# Patient Record
Sex: FEMALE | Race: BLACK OR AFRICAN AMERICAN | NOT HISPANIC OR LATINO | Employment: OTHER | URBAN - METROPOLITAN AREA
[De-identification: names, ages, dates, MRNs, and addresses within clinical notes are randomized per-mention and may not be internally consistent; named-entity substitution may affect disease eponyms.]

---

## 2018-04-13 ENCOUNTER — HISTORICAL (OUTPATIENT)
Dept: RADIOLOGY | Facility: HOSPITAL | Age: 83
End: 2018-04-13

## 2019-04-30 ENCOUNTER — HISTORICAL (OUTPATIENT)
Dept: RADIOLOGY | Facility: HOSPITAL | Age: 84
End: 2019-04-30

## 2019-06-27 ENCOUNTER — HISTORICAL (OUTPATIENT)
Dept: ADMINISTRATIVE | Facility: HOSPITAL | Age: 84
End: 2019-06-27

## 2019-06-27 LAB
ABS NEUT (OLG): 4.57 X10(3)/MCL (ref 2.1–9.2)
ALBUMIN SERPL-MCNC: 3.9 GM/DL (ref 3.4–5)
ALBUMIN/GLOB SERPL: 1.1 RATIO (ref 1.1–2)
ALP SERPL-CCNC: 94 UNIT/L (ref 38–126)
ALT SERPL-CCNC: 22 UNIT/L (ref 12–78)
AST SERPL-CCNC: 21 UNIT/L (ref 15–37)
BASOPHILS # BLD AUTO: 0.1 X10(3)/MCL (ref 0–0.2)
BASOPHILS NFR BLD AUTO: 1 %
BILIRUB SERPL-MCNC: 0.9 MG/DL (ref 0.2–1)
BILIRUBIN DIRECT+TOT PNL SERPL-MCNC: 0.2 MG/DL (ref 0–0.5)
BILIRUBIN DIRECT+TOT PNL SERPL-MCNC: 0.7 MG/DL (ref 0–0.8)
BUN SERPL-MCNC: 17 MG/DL (ref 7–18)
CALCIUM SERPL-MCNC: 9.4 MG/DL (ref 8.5–10.1)
CHLORIDE SERPL-SCNC: 106 MMOL/L (ref 98–107)
CHOLEST SERPL-MCNC: 186 MG/DL (ref 0–200)
CHOLEST/HDLC SERPL: 2.7 {RATIO} (ref 0–4)
CO2 SERPL-SCNC: 30 MMOL/L (ref 21–32)
CREAT SERPL-MCNC: 0.73 MG/DL (ref 0.55–1.02)
EOSINOPHIL # BLD AUTO: 0.4 X10(3)/MCL (ref 0–0.9)
EOSINOPHIL NFR BLD AUTO: 4 %
ERYTHROCYTE [DISTWIDTH] IN BLOOD BY AUTOMATED COUNT: 15 % (ref 11.5–17)
GLOBULIN SER-MCNC: 3.4 GM/DL (ref 2.4–3.5)
GLUCOSE SERPL-MCNC: 101 MG/DL (ref 74–106)
HCT VFR BLD AUTO: 42 % (ref 37–47)
HDLC SERPL-MCNC: 68 MG/DL (ref 35–60)
HGB BLD-MCNC: 13 GM/DL (ref 12–16)
LDLC SERPL CALC-MCNC: 96 MG/DL (ref 0–129)
LYMPHOCYTES # BLD AUTO: 2.8 X10(3)/MCL (ref 0.6–4.6)
LYMPHOCYTES NFR BLD AUTO: 33 %
MCH RBC QN AUTO: 27.1 PG (ref 27–31)
MCHC RBC AUTO-ENTMCNC: 31 GM/DL (ref 33–36)
MCV RBC AUTO: 87.5 FL (ref 80–94)
MONOCYTES # BLD AUTO: 0.6 X10(3)/MCL (ref 0.1–1.3)
MONOCYTES NFR BLD AUTO: 8 %
NEUTROPHILS # BLD AUTO: 4.57 X10(3)/MCL (ref 2.1–9.2)
NEUTROPHILS NFR BLD AUTO: 54 %
PLATELET # BLD AUTO: 269 X10(3)/MCL (ref 130–400)
PMV BLD AUTO: 12.1 FL (ref 9.4–12.4)
POTASSIUM SERPL-SCNC: 3.8 MMOL/L (ref 3.5–5.1)
PROT SERPL-MCNC: 7.3 GM/DL (ref 6.4–8.2)
RBC # BLD AUTO: 4.8 X10(6)/MCL (ref 4.2–5.4)
SODIUM SERPL-SCNC: 141 MMOL/L (ref 136–145)
TRIGL SERPL-MCNC: 112 MG/DL (ref 30–150)
VLDLC SERPL CALC-MCNC: 22 MG/DL
WBC # SPEC AUTO: 8.4 X10(3)/MCL (ref 4.5–11.5)

## 2019-07-18 ENCOUNTER — HISTORICAL (OUTPATIENT)
Dept: RADIOLOGY | Facility: HOSPITAL | Age: 84
End: 2019-07-18

## 2021-10-06 ENCOUNTER — HISTORICAL (OUTPATIENT)
Dept: ADMINISTRATIVE | Facility: HOSPITAL | Age: 86
End: 2021-10-06

## 2021-10-06 LAB
ALBUMIN SERPL-MCNC: 3.4 GM/DL (ref 3.4–4.8)
ALBUMIN/GLOB SERPL: 1 RATIO (ref 1.1–2)
ALP SERPL-CCNC: 83 UNIT/L (ref 40–150)
ALT SERPL-CCNC: 15 UNIT/L (ref 0–55)
AST SERPL-CCNC: 24 UNIT/L (ref 5–34)
BILIRUB SERPL-MCNC: 0.9 MG/DL
BILIRUBIN DIRECT+TOT PNL SERPL-MCNC: 0.4 MG/DL (ref 0–0.5)
BILIRUBIN DIRECT+TOT PNL SERPL-MCNC: 0.5 MG/DL (ref 0–0.8)
BNP BLD-MCNC: 624.1 PG/ML
BUN SERPL-MCNC: 22.1 MG/DL (ref 9.8–20.1)
CALCIUM SERPL-MCNC: 9.8 MG/DL (ref 8.4–10.2)
CHLORIDE SERPL-SCNC: 105 MMOL/L (ref 98–107)
CHOLEST SERPL-MCNC: 172 MG/DL
CHOLEST/HDLC SERPL: 3 {RATIO} (ref 0–5)
CO2 SERPL-SCNC: 26 MMOL/L (ref 23–31)
CREAT SERPL-MCNC: 1.04 MG/DL (ref 0.55–1.02)
DEPRECATED CALCIDIOL+CALCIFEROL SERPL-MC: 40.7 NG/ML (ref 30–80)
ERYTHROCYTE [DISTWIDTH] IN BLOOD BY AUTOMATED COUNT: 14.6 % (ref 11.5–17)
GLOBULIN SER-MCNC: 3.5 GM/DL (ref 2.4–3.5)
GLUCOSE SERPL-MCNC: 94 MG/DL (ref 82–115)
HCT VFR BLD AUTO: 35.7 % (ref 37–47)
HDLC SERPL-MCNC: 60 MG/DL (ref 35–60)
HGB BLD-MCNC: 11.1 GM/DL (ref 12–16)
LDLC SERPL CALC-MCNC: 101 MG/DL (ref 50–140)
MCH RBC QN AUTO: 27.3 PG (ref 27–31)
MCHC RBC AUTO-ENTMCNC: 31.1 GM/DL (ref 33–36)
MCV RBC AUTO: 87.9 FL (ref 80–94)
PLATELET # BLD AUTO: 278 X10(3)/MCL (ref 130–400)
PMV BLD AUTO: 12.1 FL (ref 9.4–12.4)
POTASSIUM SERPL-SCNC: 4.1 MMOL/L (ref 3.5–5.1)
PROT SERPL-MCNC: 6.9 GM/DL (ref 5.8–7.6)
RBC # BLD AUTO: 4.06 X10(6)/MCL (ref 4.2–5.4)
SODIUM SERPL-SCNC: 141 MMOL/L (ref 136–145)
TRIGL SERPL-MCNC: 55 MG/DL (ref 37–140)
TSH SERPL-ACNC: 2.18 UIU/ML (ref 0.35–4.94)
VLDLC SERPL CALC-MCNC: 11 MG/DL
WBC # SPEC AUTO: 9.3 X10(3)/MCL (ref 4.5–11.5)

## 2022-02-03 ENCOUNTER — HISTORICAL (OUTPATIENT)
Dept: ADMINISTRATIVE | Facility: HOSPITAL | Age: 87
End: 2022-02-03

## 2022-02-04 ENCOUNTER — HISTORICAL (OUTPATIENT)
Dept: ADMINISTRATIVE | Facility: HOSPITAL | Age: 87
End: 2022-02-04

## 2022-02-04 LAB
ABS NEUT (OLG): 4.5 (ref 2.1–9.2)
ALBUMIN SERPL-MCNC: 3.9 G/DL (ref 3.4–4.8)
ALBUMIN/GLOB SERPL: 1.1 {RATIO} (ref 1.1–2)
ALP SERPL-CCNC: 76 U/L (ref 40–150)
ALT SERPL-CCNC: 9 U/L (ref 0–55)
AST SERPL-CCNC: 20 U/L (ref 5–34)
BASOPHILS # BLD AUTO: 0.1 10*3/UL (ref 0–0.2)
BASOPHILS NFR BLD AUTO: 1 %
BILIRUB SERPL-MCNC: 0.8 MG/DL
BILIRUBIN DIRECT+TOT PNL SERPL-MCNC: 0.3 (ref 0–0.5)
BILIRUBIN DIRECT+TOT PNL SERPL-MCNC: 0.5 (ref 0–0.8)
BUN SERPL-MCNC: 20.5 MG/DL (ref 9.8–20.1)
CALCIUM SERPL-MCNC: 10.1 MG/DL (ref 8.7–10.5)
CHLORIDE SERPL-SCNC: 105 MMOL/L (ref 98–107)
CHOLEST SERPL-MCNC: 182 MG/DL
CHOLEST/HDLC SERPL: 3 {RATIO} (ref 0–5)
CO2 SERPL-SCNC: 26 MMOL/L (ref 23–31)
CREAT SERPL-MCNC: 1.01 MG/DL (ref 0.55–1.02)
EOSINOPHIL # BLD AUTO: 0.3 10*3/UL (ref 0–0.9)
EOSINOPHIL NFR BLD AUTO: 4 %
ERYTHROCYTE [DISTWIDTH] IN BLOOD BY AUTOMATED COUNT: 14.4 % (ref 11.5–17)
GLOBULIN SER-MCNC: 3.6 G/DL (ref 2.4–3.5)
GLUCOSE SERPL-MCNC: 80 MG/DL (ref 82–115)
HCT VFR BLD AUTO: 41 % (ref 37–47)
HDLC SERPL-MCNC: 67 MG/DL (ref 35–60)
HEMOLYSIS INTERF INDEX SERPL-ACNC: 31
HGB BLD-MCNC: 12.8 G/DL (ref 12–16)
ICTERIC INTERF INDEX SERPL-ACNC: 1
LDLC SERPL CALC-MCNC: 104 MG/DL (ref 50–140)
LIPEMIC INTERF INDEX SERPL-ACNC: 2
LYMPHOCYTES # BLD AUTO: 1.8 10*3/UL (ref 0.6–4.6)
LYMPHOCYTES NFR BLD AUTO: 25 %
MANUAL DIFF? (OHS): NO
MCH RBC QN AUTO: 27.5 PG (ref 27–31)
MCHC RBC AUTO-ENTMCNC: 31.2 G/DL (ref 33–36)
MCV RBC AUTO: 88 FL (ref 80–94)
MONOCYTES # BLD AUTO: 0.7 10*3/UL (ref 0.1–1.3)
MONOCYTES NFR BLD AUTO: 9 %
NEUTROPHILS # BLD AUTO: 4.5 10*3/UL (ref 2.1–9.2)
NEUTROPHILS NFR BLD AUTO: 61 %
PLATELET # BLD AUTO: 171 10*3/UL (ref 130–400)
PMV BLD AUTO: 12.2 FL (ref 9.4–12.4)
POTASSIUM SERPL-SCNC: 3.3 MMOL/L (ref 3.5–5.1)
PROT SERPL-MCNC: 7.5 G/DL (ref 5.8–7.6)
RBC # BLD AUTO: 4.66 10*6/UL (ref 4.2–5.4)
SODIUM SERPL-SCNC: 144 MMOL/L (ref 136–145)
TRIGL SERPL-MCNC: 56 MG/DL (ref 37–140)
VLDLC SERPL CALC-MCNC: 11 MG/DL
WBC # SPEC AUTO: 7.4 10*3/UL (ref 4.5–11.5)

## 2022-08-30 ENCOUNTER — LAB VISIT (OUTPATIENT)
Dept: LAB | Facility: HOSPITAL | Age: 87
End: 2022-08-30
Attending: INTERNAL MEDICINE
Payer: MEDICARE

## 2022-08-30 DIAGNOSIS — R82.90 NONSPECIFIC FINDING ON EXAMINATION OF URINE: ICD-10-CM

## 2022-08-30 DIAGNOSIS — N32.81 DETRUSOR INSTABILITY OF BLADDER: Primary | ICD-10-CM

## 2022-08-30 LAB
ANION GAP SERPL CALC-SCNC: 7 MEQ/L
BASOPHILS # BLD AUTO: 0.07 X10(3)/MCL (ref 0–0.2)
BASOPHILS NFR BLD AUTO: 0.8 %
BUN SERPL-MCNC: 28.6 MG/DL (ref 9.8–20.1)
CALCIUM SERPL-MCNC: 10.2 MG/DL (ref 8.4–10.2)
CHLORIDE SERPL-SCNC: 103 MMOL/L (ref 98–107)
CO2 SERPL-SCNC: 30 MMOL/L (ref 23–31)
CREAT SERPL-MCNC: 1.62 MG/DL (ref 0.55–1.02)
CREAT/UREA NIT SERPL: 18
EOSINOPHIL # BLD AUTO: 0.17 X10(3)/MCL (ref 0–0.9)
EOSINOPHIL NFR BLD AUTO: 2 %
ERYTHROCYTE [DISTWIDTH] IN BLOOD BY AUTOMATED COUNT: 14.7 % (ref 11.5–17)
GFR SERPLBLD CREATININE-BSD FMLA CKD-EPI: 31 MLS/MIN/1.73/M2
GLUCOSE SERPL-MCNC: 104 MG/DL (ref 82–115)
HCT VFR BLD AUTO: 44.1 % (ref 37–47)
HGB BLD-MCNC: 14.2 GM/DL (ref 12–16)
IMM GRANULOCYTES # BLD AUTO: 0.02 X10(3)/MCL (ref 0–0.04)
IMM GRANULOCYTES NFR BLD AUTO: 0.2 %
LYMPHOCYTES # BLD AUTO: 2.18 X10(3)/MCL (ref 0.6–4.6)
LYMPHOCYTES NFR BLD AUTO: 26.2 %
MCH RBC QN AUTO: 27.3 PG (ref 27–31)
MCHC RBC AUTO-ENTMCNC: 32.2 MG/DL (ref 33–36)
MCV RBC AUTO: 84.8 FL (ref 80–94)
MONOCYTES # BLD AUTO: 0.67 X10(3)/MCL (ref 0.1–1.3)
MONOCYTES NFR BLD AUTO: 8 %
NEUTROPHILS # BLD AUTO: 5.2 X10(3)/MCL (ref 2.1–9.2)
NEUTROPHILS NFR BLD AUTO: 62.8 %
NRBC BLD AUTO-RTO: 0 %
PLATELET # BLD AUTO: 194 X10(3)/MCL (ref 130–400)
PMV BLD AUTO: 12.1 FL (ref 7.4–10.4)
POTASSIUM SERPL-SCNC: 3.4 MMOL/L (ref 3.5–5.1)
RBC # BLD AUTO: 5.2 X10(6)/MCL (ref 4.2–5.4)
SODIUM SERPL-SCNC: 140 MMOL/L (ref 136–145)
WBC # SPEC AUTO: 8.3 X10(3)/MCL (ref 4.5–11.5)

## 2022-08-30 PROCEDURE — 36415 COLL VENOUS BLD VENIPUNCTURE: CPT

## 2022-08-30 PROCEDURE — 87088 URINE BACTERIA CULTURE: CPT

## 2022-08-30 PROCEDURE — 85025 COMPLETE CBC W/AUTO DIFF WBC: CPT

## 2022-08-30 PROCEDURE — 80048 BASIC METABOLIC PNL TOTAL CA: CPT

## 2022-09-01 LAB — BACTERIA UR CULT: NO GROWTH

## 2023-03-23 ENCOUNTER — LAB VISIT (OUTPATIENT)
Dept: LAB | Facility: HOSPITAL | Age: 88
End: 2023-03-23
Attending: INTERNAL MEDICINE
Payer: MEDICARE

## 2023-03-23 DIAGNOSIS — N17.9 ACUTE KIDNEY FAILURE, UNSPECIFIED: Primary | ICD-10-CM

## 2023-03-23 LAB
ANION GAP SERPL CALC-SCNC: 12 MEQ/L
BUN SERPL-MCNC: 31.4 MG/DL (ref 9.8–20.1)
CALCIUM SERPL-MCNC: 9.8 MG/DL (ref 8.4–10.2)
CHLORIDE SERPL-SCNC: 101 MMOL/L (ref 98–107)
CO2 SERPL-SCNC: 27 MMOL/L (ref 23–31)
CREAT SERPL-MCNC: 1.34 MG/DL (ref 0.55–1.02)
CREAT/UREA NIT SERPL: 23
GFR SERPLBLD CREATININE-BSD FMLA CKD-EPI: 38 MLS/MIN/1.73/M2
GLUCOSE SERPL-MCNC: 89 MG/DL (ref 82–115)
POTASSIUM SERPL-SCNC: 4.7 MMOL/L (ref 3.5–5.1)
SODIUM SERPL-SCNC: 140 MMOL/L (ref 136–145)

## 2023-03-23 PROCEDURE — 80048 BASIC METABOLIC PNL TOTAL CA: CPT

## 2023-03-23 PROCEDURE — 36415 COLL VENOUS BLD VENIPUNCTURE: CPT

## 2023-06-21 ENCOUNTER — HOSPITAL ENCOUNTER (OUTPATIENT)
Dept: RADIOLOGY | Facility: HOSPITAL | Age: 88
Discharge: HOME | End: 2023-06-21
Attending: INTERNAL MEDICINE
Payer: MEDICARE

## 2023-06-21 DIAGNOSIS — Z12.31 BREAST CANCER SCREENING BY MAMMOGRAM: ICD-10-CM

## 2023-06-21 PROCEDURE — 77067 MAMMO DIGITAL SCREENING BILAT WITH TOMO: ICD-10-PCS | Mod: 26,,, | Performed by: RADIOLOGY

## 2023-06-21 PROCEDURE — 77063 BREAST TOMOSYNTHESIS BI: CPT | Mod: 26,,, | Performed by: RADIOLOGY

## 2023-06-21 PROCEDURE — 77063 MAMMO DIGITAL SCREENING BILAT WITH TOMO: ICD-10-PCS | Mod: 26,,, | Performed by: RADIOLOGY

## 2023-06-21 PROCEDURE — 77067 SCR MAMMO BI INCL CAD: CPT | Mod: 26,,, | Performed by: RADIOLOGY

## 2023-06-21 PROCEDURE — 77067 SCR MAMMO BI INCL CAD: CPT | Mod: TC

## 2023-07-22 ENCOUNTER — HOSPITAL ENCOUNTER (EMERGENCY)
Facility: HOSPITAL | Age: 88
End: 2023-07-22
Attending: STUDENT IN AN ORGANIZED HEALTH CARE EDUCATION/TRAINING PROGRAM
Payer: MEDICARE

## 2023-07-22 VITALS
OXYGEN SATURATION: 100 % | RESPIRATION RATE: 18 BRPM | WEIGHT: 155 LBS | HEART RATE: 72 BPM | DIASTOLIC BLOOD PRESSURE: 69 MMHG | TEMPERATURE: 98 F | SYSTOLIC BLOOD PRESSURE: 175 MMHG

## 2023-07-22 DIAGNOSIS — Z97.8 ENDOTRACHEALLY INTUBATED: Primary | ICD-10-CM

## 2023-07-22 DIAGNOSIS — I63.9 STROKE: ICD-10-CM

## 2023-07-22 DIAGNOSIS — I63.512 ACUTE ISCHEMIC LEFT MCA STROKE: ICD-10-CM

## 2023-07-22 LAB
ALBUMIN SERPL-MCNC: 4 G/DL (ref 3.4–4.8)
ALBUMIN/GLOB SERPL: 0.9 RATIO (ref 1.1–2)
ALP SERPL-CCNC: 106 UNIT/L (ref 40–150)
ALT SERPL-CCNC: 29 UNIT/L (ref 0–55)
AMPHET UR QL SCN: NEGATIVE
APPEARANCE UR: CLEAR
AST SERPL-CCNC: 63 UNIT/L (ref 5–34)
BACTERIA #/AREA URNS AUTO: NORMAL /HPF
BARBITURATE SCN PRESENT UR: NEGATIVE
BASOPHILS # BLD AUTO: 0.04 X10(3)/MCL
BASOPHILS NFR BLD AUTO: 0.4 %
BENZODIAZ UR QL SCN: NEGATIVE
BILIRUB UR QL STRIP.AUTO: NEGATIVE
BILIRUBIN DIRECT+TOT PNL SERPL-MCNC: 1.1 MG/DL
BUN SERPL-MCNC: 25.9 MG/DL (ref 9.8–20.1)
CALCIUM SERPL-MCNC: 9.4 MG/DL (ref 8.4–10.2)
CANNABINOIDS UR QL SCN: NEGATIVE
CHLORIDE SERPL-SCNC: 102 MMOL/L (ref 98–107)
CHOLEST SERPL-MCNC: 192 MG/DL
CHOLEST/HDLC SERPL: 3 {RATIO} (ref 0–5)
CO2 SERPL-SCNC: 19 MMOL/L (ref 23–31)
COCAINE UR QL SCN: NEGATIVE
COLOR UR: YELLOW
CREAT SERPL-MCNC: 1.11 MG/DL (ref 0.55–1.02)
EOSINOPHIL # BLD AUTO: 0.08 X10(3)/MCL (ref 0–0.9)
EOSINOPHIL NFR BLD AUTO: 0.8 %
ERYTHROCYTE [DISTWIDTH] IN BLOOD BY AUTOMATED COUNT: 15 % (ref 11.5–17)
FENTANYL UR QL SCN: NEGATIVE
FLUAV AG UPPER RESP QL IA.RAPID: NOT DETECTED
FLUBV AG UPPER RESP QL IA.RAPID: NOT DETECTED
GFR SERPLBLD CREATININE-BSD FMLA CKD-EPI: 48 MLS/MIN/1.73/M2
GLOBULIN SER-MCNC: 4.4 GM/DL (ref 2.4–3.5)
GLUCOSE SERPL-MCNC: 124 MG/DL (ref 82–115)
GLUCOSE UR QL STRIP.AUTO: NEGATIVE
GROUP & RH: NORMAL
HCT VFR BLD AUTO: 41.7 % (ref 37–47)
HDLC SERPL-MCNC: 72 MG/DL (ref 35–60)
HGB BLD-MCNC: 13.9 G/DL (ref 12–16)
IMM GRANULOCYTES # BLD AUTO: 0.05 X10(3)/MCL (ref 0–0.04)
IMM GRANULOCYTES NFR BLD AUTO: 0.5 %
INDIRECT COOMBS GEL: NORMAL
INR BLD: 1.09 (ref 0–1.3)
KETONES UR QL STRIP.AUTO: NEGATIVE
LDLC SERPL CALC-MCNC: 99 MG/DL (ref 50–140)
LEUKOCYTE ESTERASE UR QL STRIP.AUTO: NEGATIVE
LYMPHOCYTES # BLD AUTO: 1.39 X10(3)/MCL (ref 0.6–4.6)
LYMPHOCYTES NFR BLD AUTO: 13.8 %
MCH RBC QN AUTO: 27.9 PG (ref 27–31)
MCHC RBC AUTO-ENTMCNC: 33.3 G/DL (ref 33–36)
MCV RBC AUTO: 83.7 FL (ref 80–94)
MDMA UR QL SCN: NEGATIVE
MONOCYTES # BLD AUTO: 0.47 X10(3)/MCL (ref 0.1–1.3)
MONOCYTES NFR BLD AUTO: 4.7 %
NEUTROPHILS # BLD AUTO: 8.05 X10(3)/MCL (ref 2.1–9.2)
NEUTROPHILS NFR BLD AUTO: 79.8 %
NITRITE UR QL STRIP.AUTO: NEGATIVE
NRBC BLD AUTO-RTO: 0 %
OPIATES UR QL SCN: NEGATIVE
PCP UR QL: NEGATIVE
PH UR STRIP.AUTO: 6 [PH]
PH UR: 6 [PH] (ref 3–11)
PLATELET # BLD AUTO: 203 X10(3)/MCL (ref 130–400)
PMV BLD AUTO: 12.8 FL (ref 7.4–10.4)
POTASSIUM SERPL-SCNC: 5.3 MMOL/L (ref 3.5–5.1)
PROT SERPL-MCNC: 8.4 GM/DL (ref 5.8–7.6)
PROT UR QL STRIP.AUTO: NEGATIVE
PROTHROMBIN TIME: 14 SECONDS (ref 12.5–14.5)
RBC # BLD AUTO: 4.98 X10(6)/MCL (ref 4.2–5.4)
RBC #/AREA URNS AUTO: <5 /HPF
RBC UR QL AUTO: ABNORMAL
SARS-COV-2 RNA RESP QL NAA+PROBE: NOT DETECTED
SODIUM SERPL-SCNC: 137 MMOL/L (ref 136–145)
SP GR UR STRIP.AUTO: 1.01 (ref 1–1.03)
SPECIMEN OUTDATE: NORMAL
SQUAMOUS #/AREA URNS AUTO: <5 /HPF
TRIGL SERPL-MCNC: 105 MG/DL (ref 37–140)
TROPONIN I SERPL-MCNC: 0.09 NG/ML (ref 0–0.04)
TSH SERPL-ACNC: 0.29 UIU/ML (ref 0.35–4.94)
UROBILINOGEN UR STRIP-ACNC: 1
VLDLC SERPL CALC-MCNC: 21 MG/DL
WBC # SPEC AUTO: 10.08 X10(3)/MCL (ref 4.5–11.5)
WBC #/AREA URNS AUTO: <5 /HPF

## 2023-07-22 PROCEDURE — 80053 COMPREHEN METABOLIC PANEL: CPT | Performed by: STUDENT IN AN ORGANIZED HEALTH CARE EDUCATION/TRAINING PROGRAM

## 2023-07-22 PROCEDURE — 80048 BASIC METABOLIC PNL TOTAL CA: CPT | Mod: XB

## 2023-07-22 PROCEDURE — 93005 ELECTROCARDIOGRAM TRACING: CPT | Mod: 59

## 2023-07-22 PROCEDURE — 96365 THER/PROPH/DIAG IV INF INIT: CPT

## 2023-07-22 PROCEDURE — 85025 COMPLETE CBC W/AUTO DIFF WBC: CPT | Performed by: STUDENT IN AN ORGANIZED HEALTH CARE EDUCATION/TRAINING PROGRAM

## 2023-07-22 PROCEDURE — 25500020 PHARM REV CODE 255: Performed by: STUDENT IN AN ORGANIZED HEALTH CARE EDUCATION/TRAINING PROGRAM

## 2023-07-22 PROCEDURE — 80061 LIPID PANEL: CPT | Performed by: STUDENT IN AN ORGANIZED HEALTH CARE EDUCATION/TRAINING PROGRAM

## 2023-07-22 PROCEDURE — 25000003 PHARM REV CODE 250: Performed by: STUDENT IN AN ORGANIZED HEALTH CARE EDUCATION/TRAINING PROGRAM

## 2023-07-22 PROCEDURE — 93010 EKG 12-LEAD: ICD-10-PCS | Mod: ,,, | Performed by: INTERNAL MEDICINE

## 2023-07-22 PROCEDURE — 86900 BLOOD TYPING SEROLOGIC ABO: CPT | Performed by: STUDENT IN AN ORGANIZED HEALTH CARE EDUCATION/TRAINING PROGRAM

## 2023-07-22 PROCEDURE — 63600175 PHARM REV CODE 636 W HCPCS

## 2023-07-22 PROCEDURE — 27000221 HC OXYGEN, UP TO 24 HOURS

## 2023-07-22 PROCEDURE — 84443 ASSAY THYROID STIM HORMONE: CPT | Performed by: STUDENT IN AN ORGANIZED HEALTH CARE EDUCATION/TRAINING PROGRAM

## 2023-07-22 PROCEDURE — 94002 VENT MGMT INPAT INIT DAY: CPT

## 2023-07-22 PROCEDURE — 51702 INSERT TEMP BLADDER CATH: CPT

## 2023-07-22 PROCEDURE — 81001 URINALYSIS AUTO W/SCOPE: CPT | Mod: 59 | Performed by: STUDENT IN AN ORGANIZED HEALTH CARE EDUCATION/TRAINING PROGRAM

## 2023-07-22 PROCEDURE — 94761 N-INVAS EAR/PLS OXIMETRY MLT: CPT

## 2023-07-22 PROCEDURE — 99291 CRITICAL CARE FIRST HOUR: CPT

## 2023-07-22 PROCEDURE — 93010 ELECTROCARDIOGRAM REPORT: CPT | Mod: ,,, | Performed by: INTERNAL MEDICINE

## 2023-07-22 PROCEDURE — 0240U COVID/FLU A&B PCR: CPT | Performed by: STUDENT IN AN ORGANIZED HEALTH CARE EDUCATION/TRAINING PROGRAM

## 2023-07-22 PROCEDURE — 84484 ASSAY OF TROPONIN QUANT: CPT | Performed by: STUDENT IN AN ORGANIZED HEALTH CARE EDUCATION/TRAINING PROGRAM

## 2023-07-22 PROCEDURE — 80307 DRUG TEST PRSMV CHEM ANLYZR: CPT | Performed by: STUDENT IN AN ORGANIZED HEALTH CARE EDUCATION/TRAINING PROGRAM

## 2023-07-22 PROCEDURE — 96375 TX/PRO/DX INJ NEW DRUG ADDON: CPT | Mod: 59

## 2023-07-22 PROCEDURE — 85610 PROTHROMBIN TIME: CPT | Performed by: STUDENT IN AN ORGANIZED HEALTH CARE EDUCATION/TRAINING PROGRAM

## 2023-07-22 PROCEDURE — 27200966 HC CLOSED SUCTION SYSTEM

## 2023-07-22 PROCEDURE — 63600175 PHARM REV CODE 636 W HCPCS: Performed by: STUDENT IN AN ORGANIZED HEALTH CARE EDUCATION/TRAINING PROGRAM

## 2023-07-22 RX ORDER — SUCCINYLCHOLINE CHLORIDE 20 MG/ML
100 INJECTION INTRAMUSCULAR; INTRAVENOUS
Status: COMPLETED | OUTPATIENT
Start: 2023-07-22 | End: 2023-07-22

## 2023-07-22 RX ORDER — ETOMIDATE 2 MG/ML
20 INJECTION INTRAVENOUS
Status: COMPLETED | OUTPATIENT
Start: 2023-07-22 | End: 2023-07-22

## 2023-07-22 RX ORDER — PROPOFOL 10 MG/ML
INJECTION, EMULSION INTRAVENOUS
Status: COMPLETED
Start: 2023-07-22 | End: 2023-07-22

## 2023-07-22 RX ORDER — PROPOFOL 10 MG/ML
0-50 INJECTION, EMULSION INTRAVENOUS CONTINUOUS
Status: DISCONTINUED | OUTPATIENT
Start: 2023-07-22 | End: 2023-07-22 | Stop reason: HOSPADM

## 2023-07-22 RX ORDER — ONDANSETRON 2 MG/ML
INJECTION INTRAMUSCULAR; INTRAVENOUS
Status: COMPLETED
Start: 2023-07-22 | End: 2023-07-22

## 2023-07-22 RX ORDER — ONDANSETRON 2 MG/ML
4 INJECTION INTRAMUSCULAR; INTRAVENOUS
Status: COMPLETED | OUTPATIENT
Start: 2023-07-22 | End: 2023-07-22

## 2023-07-22 RX ADMIN — PROPOFOL 35 MCG/KG/MIN: 10 INJECTION, EMULSION INTRAVENOUS at 11:07

## 2023-07-22 RX ADMIN — ONDANSETRON 4 MG: 2 INJECTION INTRAMUSCULAR; INTRAVENOUS at 10:07

## 2023-07-22 RX ADMIN — ETOMIDATE 20 MG: 2 INJECTION INTRAVENOUS at 11:07

## 2023-07-22 RX ADMIN — SUCCINYLCHOLINE CHLORIDE 100 MG: 20 INJECTION, SOLUTION INTRAMUSCULAR; INTRAVENOUS at 11:07

## 2023-07-22 RX ADMIN — IOPAMIDOL 100 ML: 755 INJECTION, SOLUTION INTRAVENOUS at 10:07

## 2023-07-22 NOTE — ED NOTES
Dr Leonardo [facility] level of care. Will proceed with transfer process.

## 2023-07-22 NOTE — ED NOTES
Dr Leonardo in consult room to speak with family regarding plan of care. Awaiting orders based on family decision secondary to pt altered mental status.

## 2023-07-22 NOTE — ED PROVIDER NOTES
"Encounter Date: 7/22/2023    SCRIBE #1 NOTE: I, Kelton Leigh, am scribing for, and in the presence of,  Dr. Leonardo. I have scribed the following portions of the note - Other sections scribed: HPI, ROS, Physical Exam, MDM, Attending.       History     Chief Complaint   Patient presents with    Facial Droop     Pt to ER via [facility] for right facial droop, slurred speech and right side weakness.  Started 1 hr PTA.  EMS .  GCS 11.  Code fast called on arrival     89 y/o female with history of HTN and HLD presents to ED via EMS for R-sided weakness and R-sided facial droop onset within the past hour and a half.  Pt was last seen normal by son about 1.5 hours ago, and then he heard her grunting for attention a while after that.  She was found "slumped" with R-sided facial droop and R-sided weakness.  EMS reports she has been making "inappropriate sounds" and intermittently following commands.  Her CBG was 136 and vitals were stable.  She is not on thinners.  EMS denies any vomiting.    Update:  I had an extensive discussion with the patient's son as he arrived to the emergency department.  He states that he did not see the patient normal this morning.  He states he heard her grunting at approximately 7:00 a.m. so he went into the bathroom and saw that she was on the ground.  She was unable to move or help him so he was working with her for approximately 45 minutes or so prior to calling EMS trying to get her up.  He states he again did not see her normal this morning and he was not sure what her last known normal was accept that he saw her normal before bedtime at some point yesterday evening.    The history is provided by the EMS personnel. The history is limited by the condition of the patient.     Review of patient's allergies indicates:   Allergen Reactions    Acetaminophen-codeine      Other reaction(s): lightheaded, vomiting    Cortisone      Other reaction(s): Not Indicated    Iodine Other (See Comments)     " confusion  Other reaction(s): confusion    Lisinopril Other (See Comments)     cough    Propoxyphene n-acetaminophen      Other reaction(s): vomiting    Codeine Nausea And Vomiting     Other reaction(s): Not Indicated    Hydrocodone-acetaminophen Nausea And Vomiting     Other reaction(s): Not Indicated, vomiting     No past medical history on file.  No past surgical history on file.  No family history on file.     Review of Systems   Unable to perform ROS: Mental status change       Physical Exam     Initial Vitals [07/22/23 1014]   BP Pulse Resp Temp SpO2   (!) 164/114 62 18 97.5 °F (36.4 °C) 98 %      MAP       --         Physical Exam    Nursing note and vitals reviewed.  HENT:   Head: Normocephalic and atraumatic.   Nose: Nose normal.   Mouth/Throat: Oropharynx is clear and moist.   Eyes: Pupils are equal, round, and reactive to light.   Left gaze preference   Neck: Neck supple.   Cardiovascular:  Normal rate and regular rhythm.           No murmur heard.  Pulmonary/Chest: Breath sounds normal. No respiratory distress. She has no wheezes. She has no rales.   Abdominal: Abdomen is soft. She exhibits no distension. There is no abdominal tenderness.   Musculoskeletal:      Cervical back: Neck supple.     Neurological: GCS eye subscore is 2. GCS verbal subscore is 2. GCS motor subscore is 5.   R-sided facial droop; R-sided weakness; drowsy but arousable, poor effort on exam   Skin: Skin is warm. Capillary refill takes less than 2 seconds. No rash noted.   No evidence of trauma         ED Course   Critical Care    Date/Time: 7/22/2023 10:21 AM    Performed by: Fausto Leonardo MD  Authorized by: Fausto Leonardo MD  Direct patient critical care time: 75 minutes  Total critical care time (exclusive of procedural time) : 75 minutes  Critical care time was exclusive of separately billable procedures and treating other patients.  Critical care was necessary to treat or prevent imminent or life-threatening  deterioration of the following conditions: CNS failure or compromise.  Critical care was time spent personally by me on the following activities: blood draw for specimens, development of treatment plan with patient or surrogate, discussions with consultants, evaluation of patient's response to treatment, examination of patient, obtaining history from patient or surrogate, ordering and performing treatments and interventions, ordering and review of laboratory studies, ordering and review of radiographic studies, pulse oximetry, re-evaluation of patient's condition and review of old charts.      Intubation    Date/Time: 7/22/2023 10:00 AM  Location procedure was performed: [facility] EMERGENCY DEPARTMENT    Performed by: Fausto Leonardo MD  Authorized by: Fausto Leonardo MD  Consent Done: Emergent Situation  Indications: airway protection  Intubation method: video-assisted  Patient status: paralyzed (RSI)  Preoxygenation: BVM and mask  Sedatives: etomidate  Paralytic: succinylcholine  Laryngoscope size: Glide 4  Tube size: 7.5 mm  Tube type: cuffed  Number of attempts: 1  Cords visualized: yes  Post-procedure assessment: chest rise, ETCO2 monitor and CO2 detector  Breath sounds: clear  Cuff inflated: yes  ETT to lip: 22 cm  Tube secured with: adhesive tape, bite block and ETT doyle  Chest x-ray interpreted by me.  Chest x-ray findings: endotracheal tube in appropriate position  Patient tolerance: Patient tolerated the procedure well with no immediate complications        Labs Reviewed   CBC WITH DIFFERENTIAL - Abnormal; Notable for the following components:       Result Value    MPV 12.8 (*)     IG# 0.05 (*)     All other components within normal limits   CBC W/ AUTO DIFFERENTIAL    Narrative:     The following orders were created for panel order CBC W/ AUTO DIFFERENTIAL.  Procedure                               Abnormality         Status                     ---------                               -----------          ------                     CBC with Differential[[id number]]        Abnormal            Final result                 Please view results for these tests on the individual orders.   COMPREHENSIVE METABOLIC PANEL   PROTIME-INR   TSH   LIPID PANEL   TROPONIN I   COVID/FLU A&B PCR   POCT GLUCOSE, HAND-HELD DEVICE   TYPE & SCREEN          Imaging Results              X-Ray Chest AP Portable (Final result)  Result time 07/22/23 11:10:30      Final result by Fausto Lizarraga MD (07/22/23 11:10:30)                   Impression:      As above.      Electronically signed by: Fausto Lizarraga  Date:    07/22/2023  Time:    11:10               Narrative:    EXAMINATION:  XR CHEST AP PORTABLE    CLINICAL HISTORY:  Stroke;    TECHNIQUE:  Single view of the chest    COMPARISON:  11/30/2018    FINDINGS:  ET tube terminates just above the tatum.  No pleural effusion or pneumothorax.                                       CTA Head and Neck (xpd) (Final result)  Result time 07/22/23 11:00:18      Final result by Capri Lee MD (07/22/23 11:00:18)                   Impression:      Occlusion of the left M1 segment and carotid terminus.    ER aware of an occlusion prior to dictation.      Electronically signed by: Capri Lee  Date:    07/22/2023  Time:    11:00               Narrative:    EXAMINATION:  CTA HEAD AND NECK (XPD)    CLINICAL HISTORY:  Neuro deficit, acute, stroke suspected;    TECHNIQUE:  Axial images obtained through the cervical region and [place] of [place] before and after the administration of intravenous contrast.    Coronal, sagittal, MIP and 3D reconstructions were obtained from the axial data.    Automatic exposure control was utilized to limit radiation dose.    Radiation Dose:    Total DLP: 1411 mGy*cm    COMPARISON:  CT head from the same day    FINDINGS:  Head CT with contrast:    No interval changes when compared to the previous CT.    No enhancing abnormalities.    If present, stenosis of the  carotid bulbs is measured based on NASCET criteria,    i.e. area of maximal stenosis compared to the cervical ICA distal to the bulb.    Cervical CTA:    The origins of the great vessels are patent.  The common carotid arteries and carotid bulbs are patent with mild calcifications at the right carotid bulb.  The right internal carotid artery is patent.  There is a decreased flow in the left internal carotid artery related to a distal occlusion.    The vertebral arteries are patent.    Intracranial CTA:    There is occlusion of the left M1 segment and carotid terminus, with minimal flow in the distal MCA branches.  There is partial reconstitution of flow into the left A1 segment likely from the [id number].  The right internal carotid artery, right middle cerebral arteries and right anterior cerebral artery patent.    The vertebral arteries, basilar artery and posterior cerebral arteries are patent.    The dural venous sinuses are patent.    Additional findings:    Trace pleural effusions.                                       CT Head Without Contrast (Final result)  Result time 07/22/23 10:28:43      Final result by Fausto Lizarraga MD (07/22/23 10:28:43)                   Impression:      Findings of chronic microvascular ischemic disease.  No acute intracranial hemorrhage.  The left MCA is somewhat hyperdense.  Acute occlusion is not excluded.      Electronically signed by: Fausto Lizarraga  Date:    07/22/2023  Time:    10:28               Narrative:    EXAMINATION:  CT HEAD WITHOUT CONTRAST    CLINICAL HISTORY:  Neuro deficit, acute, stroke suspected;    TECHNIQUE:  Low dose axial images were obtained through the head.  Coronal and sagittal reformations were also performed. Contrast was not administered.    Automatic exposure control was utilized to reduce the patient's radiation dose.    DLP= 1852    COMPARISON:  11/26/2018    FINDINGS:  No acute intracranial hemorrhage, edema or mass. No acute parenchymal  abnormality.    Diffuse cerebral atrophy with concordant ventricular enlargement.    Scattered hypodensities throughout the deep periventricular white matter.    The osseous structures are normal.    The mastoid air cells are clear.    The auditory canals are patent bilaterally.    The globes and orbital contents are normal bilaterally.    The visualized maxillary, ethmoid and sphenoid sinuses are clear.                                       Medications   propofol (DIPRIVAN) 10 mg/mL infusion (35 mcg/kg/min × 70.3 kg (Dosing Weight) Intravenous New Bag 7/22/23 1100)   iopamidoL (ISOVUE-370) injection 100 mL (100 mLs Intravenous Given 7/22/23 1037)   etomidate injection 20 mg (20 mg Intravenous Given 7/22/23 1100)   succinylcholine injection 100 mg (100 mg Intravenous Given 7/22/23 1100)   ondansetron injection 4 mg (4 mg Intravenous Given 7/22/23 1050)              Scribe Attestation:   Scribe #1: I performed the above scribed service and the documentation accurately describes the services I performed. I attest to the accuracy of the note.    Attending Attestation:           Physician Attestation for Scribe:  Physician Attestation Statement for Scribe #1: I, reviewed documentation, as scribed by Kelton Leigh in my presence, and it is both accurate and complete.           Medical Decision Making  Problems Addressed:  Endotracheally intubated: acute illness or injury  Stroke: acute illness or injury that poses a threat to life or bodily functions    Amount and/or Complexity of Data Reviewed  Labs: ordered.  Radiology: ordered. Decision-making details documented in ED Course.    Risk  Prescription drug management.        Differential diagnosis (includes but is not limited to):   ICH, CVA, ACS, arrhythmia, dehydration, kidney injury, electrolyte abnormalities    MDM Narrative  88-year-old female presents for focal neurological deficits including facial droop, slurred speech, right-sided weakness.  Initial report from  EMS was that the patient had acute onset at approximately 8:30 a.m. day 45 today, however the patient's son is here at the bedside and he states that he did not see her at all this morning and just her grunting at approximately 7:00 a.m. when he went to check on her and noticed that she was on the ground and not moving well.  He states he was trying to help her get up and whe she was not able to do so, that is when he called EMS.  He states he does not know when she was last normal as he did not see her when she woke up this morning and he was not sure what caused her to fall this morning.  He states last time he saw her normal was yesterday evening before bedtime.  Patient has been expedited CT which shows no acute hemorrhage and concern for hyperdense MCA, CTA is pending.  EKG and labs pending.  Chest x-ray pending.  Stroke Neurology has been consulted, appreciate recommendations.  If the patient has evidence of a large vessel occlusion, patient will require transfer as we do not have Interventional Neurology available to us today.  I did discuss goals of care with the patient's son and he would like her to be full code including intubation if necessary.    Update:  CTA shows evidence of an M1 occlusion consistent with a large vessel occlusion.  As we do not have Interventional Neurology, patient will require transfer.  [facility] and transfer center has been contacted.  Patient's mental status is declining, patient has been intubated for airway protection, see separate procedure note.  Patient is not a TNK candidate due to timing outside of the window given wake-up stroke/unknown last known normal.  ASA to be ordered. However, patient is a candidate for thrombectomy given symptoms within 24 hours and large vessel occlusion on imaging.    Update:  Patient has been accepted by [facility] by Dr. Barillas for Interventional Neurology evaluation.    Dispo: Transfer    My independent radiology interpretation: as  "above  Point of care US (independently performed and interpreted):   Decision rules/clinical scoring: NIH 30    Sepsis Perfusion Assessment: n/a    Amount and/or Complexity of Data Reviewed  Independent historian: EMS   Summary of history: Pt was last seen normal by son about 1.5 hours ago, and then he heard her grunting for attention a while after that.  She was found "slumped" with R-sided facial droop and R-sided weakness.  EMS reports she has been making "inappropriate sounds" and intermittently following commands.  Her CBG was 136 and vitals were stable.  She is not on thinners.  EMS denies any vomiting.  External data reviewed: notes from previous admissions, notes from previous ED visits, and notes from clinic visits  Summary of data reviewed:  Prior notes and records reviewed in the electronic medical record  Risk and benefits of testing: discussed   Labs: ordered and reviewed  Radiology: ordered and independent interpretation performed (see above or ED course)  ECG/medicine tests: ordered and independent interpretation performed (see above or ED course)  Discussion of management or test interpretation with external provider(s): discussed with Neurology consultant   Summary of discussion: as above    Risk  Parenteral controlled substances   Drug therapy requiring intense monitoring for toxicity   Decision regarding hospitalization  Decision regarding transfer   Shared decision making     Critical Care   minutes     Data Reviewed/Counseling: I have personally reviewed the patient's vital signs, nursing notes, and other relevant tests, information, and imaging. I had a detailed discussion regarding the historical points, exam findings, and any diagnostic results supporting the discharge diagnosis. I personally performed the history, PE, MDM and procedures as documented above and agree with the scribe's documentation.    Portions of this note were dictated using voice recognition software. Although it was " reviewed for accuracy, some inherent voice recognition errors may have occurred and may be present in this document.        ED Course as of 07/22/23 1142   Sat Jul 22, 2023   1020 Unknown LKN per patient's son. [MC]   1020 CT Head Without Contrast  Notified by radiology of no head bleed but hyperdense MCA concerning for large vessel occlusion. CTA pending. [MC]   1025 Patient is not a candidate for TNK given unknown time of symptom onset/wake-up stroke per discussion with the patient's son who is present with her at home this morning. [MC]   1030 Patient's mental status appears to be declining, weak cough, concern for impending respiratory failure.  Patient to be intubated for airway protection. [MC]   1111 Patient accepted at [facility] by Dr. Garcia. [MC]   1116 X-Ray Chest AP Portable  Independently visualized/reviewed by me during the ED visit.  - ETT in position, no PTX [MC]      ED Course User Index  [MC] Fausto Leonardo MD                   Clinical Impression:   Final diagnoses:  [I63.9] Stroke  [Z97.8] Endotracheally intubated (Primary)  [I63.512] Acute ischemic left MCA stroke        ED Disposition Condition    Transfer to Another Facility Stable                Fausto Leonardo MD  07/22/23 1142       Fausto Leonardo MD  08/14/23 0338

## 2023-07-22 NOTE — ED NOTES
Pt placed on 15 L oxymask for preparation of intubation. Pt sinus rhythm on monitor. Pt does not respond purposefully to verbal stimuli.

## 2023-07-22 NOTE — ED NOTES
After discussion with family, pt son reports pt last known normal was last night. Unknown time of onset. TNKASE not indicated secondary to unknown time of onset. Will plan to intubate secondary to GCS-8.

## 2023-07-24 LAB
ANION GAP SERPL CALC-SCNC: 18 MMOL/L (ref 8–16)
BUN SERPL-MCNC: 28 MG/DL (ref 6–30)
CHLORIDE SERPL-SCNC: 102 MMOL/L (ref 95–110)
CREAT SERPL-MCNC: 1.1 MG/DL (ref 0.5–1.4)
GLUCOSE SERPL-MCNC: 121 MG/DL (ref 70–110)
HCT VFR BLD CALC: 45 %PCV (ref 36–54)
HGB BLD-MCNC: 15 G/DL
POC IONIZED CALCIUM: 1.26 MMOL/L (ref 1.06–1.42)
POC TCO2 (MEASURED): 26 MMOL/L (ref 23–29)
POTASSIUM BLD-SCNC: 3.8 MMOL/L (ref 3.5–5.1)
SAMPLE: ABNORMAL
SODIUM BLD-SCNC: 141 MMOL/L (ref 136–145)

## 2023-09-11 ENCOUNTER — LAB REQUISITION (OUTPATIENT)
Dept: LAB | Facility: HOSPITAL | Age: 88
End: 2023-09-11
Payer: MEDICARE

## 2023-09-11 DIAGNOSIS — I73.9 PERIPHERAL VASCULAR DISEASE, UNSPECIFIED: ICD-10-CM

## 2023-09-11 DIAGNOSIS — I10 ESSENTIAL (PRIMARY) HYPERTENSION: ICD-10-CM

## 2023-09-11 LAB
ALBUMIN SERPL-MCNC: 3.2 G/DL (ref 3.4–4.8)
ALBUMIN/GLOB SERPL: 1.1 RATIO (ref 1.1–2)
ALP SERPL-CCNC: 92 UNIT/L (ref 40–150)
ALT SERPL-CCNC: 16 UNIT/L (ref 0–55)
AST SERPL-CCNC: 24 UNIT/L (ref 5–34)
BASOPHILS # BLD AUTO: 0.05 X10(3)/MCL
BASOPHILS NFR BLD AUTO: 0.7 %
BILIRUB SERPL-MCNC: 0.9 MG/DL
BUN SERPL-MCNC: 17.5 MG/DL (ref 9.8–20.1)
CALCIUM SERPL-MCNC: 9.1 MG/DL (ref 8.4–10.2)
CHLORIDE SERPL-SCNC: 105 MMOL/L (ref 98–107)
CO2 SERPL-SCNC: 25 MMOL/L (ref 23–31)
CREAT SERPL-MCNC: 0.9 MG/DL (ref 0.55–1.02)
EOSINOPHIL # BLD AUTO: 0.13 X10(3)/MCL (ref 0–0.9)
EOSINOPHIL NFR BLD AUTO: 1.7 %
ERYTHROCYTE [DISTWIDTH] IN BLOOD BY AUTOMATED COUNT: 14.5 % (ref 11.5–17)
GFR SERPLBLD CREATININE-BSD FMLA CKD-EPI: >60 MLS/MIN/1.73/M2
GLOBULIN SER-MCNC: 2.9 GM/DL (ref 2.4–3.5)
GLUCOSE SERPL-MCNC: 95 MG/DL (ref 82–115)
HCT VFR BLD AUTO: 35.1 % (ref 37–47)
HGB BLD-MCNC: 11 G/DL (ref 12–16)
IMM GRANULOCYTES # BLD AUTO: 0.01 X10(3)/MCL (ref 0–0.04)
IMM GRANULOCYTES NFR BLD AUTO: 0.1 %
LYMPHOCYTES # BLD AUTO: 1.79 X10(3)/MCL (ref 0.6–4.6)
LYMPHOCYTES NFR BLD AUTO: 24 %
MCH RBC QN AUTO: 28.2 PG (ref 27–31)
MCHC RBC AUTO-ENTMCNC: 31.3 G/DL (ref 33–36)
MCV RBC AUTO: 90 FL (ref 80–94)
MONOCYTES # BLD AUTO: 0.64 X10(3)/MCL (ref 0.1–1.3)
MONOCYTES NFR BLD AUTO: 8.6 %
NEUTROPHILS # BLD AUTO: 4.84 X10(3)/MCL (ref 2.1–9.2)
NEUTROPHILS NFR BLD AUTO: 64.9 %
PLATELET # BLD AUTO: 262 X10(3)/MCL (ref 130–400)
PMV BLD AUTO: 12.2 FL (ref 7.4–10.4)
POTASSIUM SERPL-SCNC: 4.1 MMOL/L (ref 3.5–5.1)
PROT SERPL-MCNC: 6.1 GM/DL (ref 5.8–7.6)
RBC # BLD AUTO: 3.9 X10(6)/MCL (ref 4.2–5.4)
SODIUM SERPL-SCNC: 140 MMOL/L (ref 136–145)
WBC # SPEC AUTO: 7.46 X10(3)/MCL (ref 4.5–11.5)

## 2023-09-11 PROCEDURE — 80053 COMPREHEN METABOLIC PANEL: CPT | Performed by: INTERNAL MEDICINE

## 2023-09-11 PROCEDURE — 85025 COMPLETE CBC W/AUTO DIFF WBC: CPT | Performed by: INTERNAL MEDICINE
